# Patient Record
Sex: MALE | Race: WHITE
[De-identification: names, ages, dates, MRNs, and addresses within clinical notes are randomized per-mention and may not be internally consistent; named-entity substitution may affect disease eponyms.]

---

## 2019-11-18 ENCOUNTER — HOSPITAL ENCOUNTER (OUTPATIENT)
Dept: HOSPITAL 88 - CT | Age: 44
End: 2019-11-18
Attending: INTERNAL MEDICINE
Payer: COMMERCIAL

## 2019-11-18 DIAGNOSIS — R29.6: ICD-10-CM

## 2019-11-18 DIAGNOSIS — S09.90XA: Primary | ICD-10-CM

## 2019-11-18 PROCEDURE — 70450 CT HEAD/BRAIN W/O DYE: CPT

## 2019-11-18 NOTE — DIAGNOSTIC IMAGING REPORT
EXAMINATION: Head CT without contrast.  





HISTORY:Repeated fall, trauma last week.



COMPARISON:None.

TECHNIQUE: Multidetector axial images were obtained from the foramen magnum to

the vertex without contrast. The images were reconstructed using brain and bone

algorithms.  Thin section brain images were reformatted into coronal and

sagittal planes.

Dose modulation, iterative reconstruction, and/or weight based adjustment of

the mA/kV was utilized to reduce the radiation dose to as low as reasonably

achievable.



Intravenous contrast: None  



IMAGE QUALITY: Acceptable.



FINDINGS:

    Skull/scalp: No lytic or blastic. lesions.  No surgical changes.

    Parenchyma: Focal hypodensity in the inferior and posterior aspect of right

lentiform nucleus represents old lacunar infarct or prominent perivascular

space.

No acute hemorrhage, mass or acute major vascular territorial infarct.

    Arteries: No density suggestive of thrombosis.   

    Dural sinuses: No abnormal density suggestive of thrombosis. 

    Ventricles: No hydrocephalus or displacement.

    Extra-axial spaces: No abnormal density.  

    Brain volume: Normal for age.

    Craniocervical junction: No mass, Chiari malformation, or basilar

invagination.

    Sella: No mass. 

    Paranasal/mastoid sinuses: Imaged portions unremarkable.





IMPRESSION:

No acute intracranial abnormality.



Signed by: Dr. Yaneli Mejia M.D. on 11/18/2019 7:57 PM

## 2020-08-17 ENCOUNTER — HOSPITAL ENCOUNTER (OUTPATIENT)
Dept: HOSPITAL 88 - PT | Age: 45
LOS: 14 days | End: 2020-08-31
Attending: INTERNAL MEDICINE
Payer: COMMERCIAL

## 2020-08-17 DIAGNOSIS — M25.672: ICD-10-CM

## 2020-08-17 DIAGNOSIS — R29.6: ICD-10-CM

## 2020-08-17 DIAGNOSIS — M62.81: ICD-10-CM

## 2020-08-17 DIAGNOSIS — R26.9: ICD-10-CM

## 2020-08-17 DIAGNOSIS — M25.671: Primary | ICD-10-CM
